# Patient Record
Sex: MALE | Race: WHITE
[De-identification: names, ages, dates, MRNs, and addresses within clinical notes are randomized per-mention and may not be internally consistent; named-entity substitution may affect disease eponyms.]

---

## 2019-08-22 ENCOUNTER — HOSPITAL ENCOUNTER (OUTPATIENT)
Dept: HOSPITAL 11 - JP.SDS | Age: 74
Discharge: HOME | End: 2019-08-22
Attending: SURGERY
Payer: MEDICARE

## 2019-08-22 DIAGNOSIS — K21.9: ICD-10-CM

## 2019-08-22 DIAGNOSIS — N18.9: ICD-10-CM

## 2019-08-22 DIAGNOSIS — I25.10: ICD-10-CM

## 2019-08-22 DIAGNOSIS — E78.00: ICD-10-CM

## 2019-08-22 DIAGNOSIS — K57.30: ICD-10-CM

## 2019-08-22 DIAGNOSIS — I12.9: ICD-10-CM

## 2019-08-22 DIAGNOSIS — Z12.11: Primary | ICD-10-CM

## 2019-08-22 DIAGNOSIS — K63.5: ICD-10-CM

## 2019-08-22 PROCEDURE — 88305 TISSUE EXAM BY PATHOLOGIST: CPT

## 2019-08-22 PROCEDURE — 45385 COLONOSCOPY W/LESION REMOVAL: CPT

## 2019-08-22 NOTE — OR
DATE OF PROCEDURE:  08/22/2019

 

SURGEON:  Akin Davis MD

 

PROCEDURE:  Colonoscopy.

 

FINDINGS:

1. Sigmoid colon polyp, approximately 5 mm, completely removed using snare.

2. Diverticulosis, mild, mostly limited to sigmoid colon.

 

COMPLICATIONS:  None.

 

ASSISTANT:  None.

 

ANESTHETIC:  MAC.

 

RISKS:  Risks, benefits, alternatives, and limitations including but not limited to,

infection, bleeding, and perforation were explained to the patient, wished to proceed.

 

PREOPERATIVE DIAGNOSIS:  Screening colonoscopy.

 

POSTOPERATIVE DIAGNOSIS:  Screening colonoscopy.

 

PROCEDURE IN DETAIL:  The patient was placed in left lateral decubitus position.  Digital

rectal exam was performed without abnormality.  The scope was introduced and advanced

atraumatically to the ileocecal valve.

 

The scope was brought back through the ascending, transverse, descending colon, and

retroflexed.

 

No evidence of old or new blood.  No masses.  No polyps.  Advanced to the ileocecal valve.

Scope was brought back through the colon.  The patient did have diverticulosis, which would

be described as mild and limited to sigmoid colon, without evidence of diverticulitis or

bleeding.  The sigmoid colon polyp was approximately 5 mm, completely removed using snare.

No abnormalities noted after removal.  No abnormalities on retroflexion.  The patient

tolerated the procedure well.

 

 

 

 

Akin Davis MD

DD:  08/22/2019 08:33:59

DT:  08/22/2019 12:23:09

Job #:  [id number]

## 2022-06-01 ENCOUNTER — HOSPITAL ENCOUNTER (EMERGENCY)
Dept: HOSPITAL 11 - JP.ED | Age: 77
Discharge: HOME | End: 2022-06-01
Payer: MEDICARE

## 2022-06-01 DIAGNOSIS — Z79.02: ICD-10-CM

## 2022-06-01 DIAGNOSIS — Z79.82: ICD-10-CM

## 2022-06-01 DIAGNOSIS — D72.819: ICD-10-CM

## 2022-06-01 DIAGNOSIS — E86.0: Primary | ICD-10-CM

## 2022-06-01 DIAGNOSIS — Z91.09: ICD-10-CM
